# Patient Record
Sex: FEMALE | Race: WHITE | NOT HISPANIC OR LATINO | URBAN - NONMETROPOLITAN AREA
[De-identification: names, ages, dates, MRNs, and addresses within clinical notes are randomized per-mention and may not be internally consistent; named-entity substitution may affect disease eponyms.]

---

## 2023-01-26 ENCOUNTER — OFFICE (OUTPATIENT)
Dept: URBAN - NONMETROPOLITAN AREA CLINIC 5 | Facility: CLINIC | Age: 48
End: 2023-01-26
Payer: COMMERCIAL

## 2023-01-26 VITALS
RESPIRATION RATE: 18 BRPM | HEIGHT: 62 IN | DIASTOLIC BLOOD PRESSURE: 98 MMHG | HEART RATE: 105 BPM | WEIGHT: 229 LBS | SYSTOLIC BLOOD PRESSURE: 137 MMHG

## 2023-01-26 DIAGNOSIS — R10.84 GENERALIZED ABDOMINAL PAIN: ICD-10-CM

## 2023-01-26 DIAGNOSIS — R19.8 OTHER SPECIFIED SYMPTOMS AND SIGNS INVOLVING THE DIGESTIVE S: ICD-10-CM

## 2023-01-26 DIAGNOSIS — K76.0 FATTY (CHANGE OF) LIVER, NOT ELSEWHERE CLASSIFIED: ICD-10-CM

## 2023-01-26 DIAGNOSIS — Z86.010 PERSONAL HISTORY OF COLONIC POLYPS: ICD-10-CM

## 2023-01-26 DIAGNOSIS — R10.13 EPIGASTRIC PAIN: ICD-10-CM

## 2023-01-26 PROCEDURE — 99204 OFFICE O/P NEW MOD 45 MIN: CPT | Performed by: INTERNAL MEDICINE

## 2023-01-26 NOTE — SERVICEHPINOTES
Matilde Kelly   is a   46 yo  female  with a past medical history of anxiety, depression, bipolar disorder, GERD, HTN, HLD, fatty liver disease, and IBS who presents to establish care.  Patient reports that she has a history of fatty liver.  She previously followed with a gastroenterologist in [place].  She moved from there "at least 3 years ago".  She would like to reestablish with a GI physician.  She was told then she had fatty liver disease.  She did undergo biopsy.  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  She is on a fluid pill for unclear reasons.  She does report having an EGD and was told she had a hiatal hernia.  She does endorse a dull/achy pain across the top of her abdomen which comes and goes.  She has not noted association with timing of the day or certain foods.  It can last up to half a day at a time.  She has not seen any alleviating or exacerbating factors.  She also has epigastric discomfort which she attributes to her history of hiatal hernia.  She does have frequent acid reflux and dysphagia.  She denies odynophagia, weight loss, bright red blood per rectum, melena, nausea, vomiting, or hematemesis.  She is taking omeprazole 40 mg twice daily 30-45 minutes prior to breakfast and dinner.  She  also reports her bowels can be irregular and loose at times.  She never has constipation.  She has never attempted fiber for regularity bowels.  She has never been on ib Heriberto for abdominal cramping.  She has not had any recent blood work for her fatty liver disease or an ultrasound.

## 2023-01-26 NOTE — SERVICENOTES
Given patient's epigastric pain, history of hiatal hernia, and questionable liver disease will plan for EGD for further assessment.  Given history of adenomatous polyp in the past and irregular bowel habits will proceed with repeat colonoscopy as well.  For patient's suspected IBS I counseled her on soluble fiber use with Metamucil as above and attempting ib Heriberto.  For her history of fatty liver disease will plan for blood work as above.  Will track down prior records for review.  She reports she is "on a fluid pill" and believes the prior physician told her that she had some type of inherited abnormality in her liver.  Will plan to review the records.

## 2023-03-14 ENCOUNTER — ON CAMPUS - OUTPATIENT (OUTPATIENT)
Dept: URBAN - NONMETROPOLITAN AREA HOSPITAL 28 | Facility: HOSPITAL | Age: 48
End: 2023-03-14
Payer: COMMERCIAL

## 2023-03-14 DIAGNOSIS — K62.1 RECTAL POLYP: ICD-10-CM

## 2023-03-14 DIAGNOSIS — D12.3 BENIGN NEOPLASM OF TRANSVERSE COLON: ICD-10-CM

## 2023-03-14 DIAGNOSIS — K29.70 GASTRITIS, UNSPECIFIED, WITHOUT BLEEDING: ICD-10-CM

## 2023-03-14 DIAGNOSIS — R13.10 DYSPHAGIA, UNSPECIFIED: ICD-10-CM

## 2023-03-14 DIAGNOSIS — K57.30 DIVERTICULOSIS OF LARGE INTESTINE WITHOUT PERFORATION OR ABS: ICD-10-CM

## 2023-03-14 DIAGNOSIS — R19.4 CHANGE IN BOWEL HABIT: ICD-10-CM

## 2023-03-14 DIAGNOSIS — K22.2 ESOPHAGEAL OBSTRUCTION: ICD-10-CM

## 2023-03-14 DIAGNOSIS — K44.9 DIAPHRAGMATIC HERNIA WITHOUT OBSTRUCTION OR GANGRENE: ICD-10-CM

## 2023-03-14 PROCEDURE — 43239 EGD BIOPSY SINGLE/MULTIPLE: CPT | Performed by: INTERNAL MEDICINE

## 2023-03-14 PROCEDURE — 45385 COLONOSCOPY W/LESION REMOVAL: CPT | Performed by: INTERNAL MEDICINE

## 2023-03-14 PROCEDURE — 45380 COLONOSCOPY AND BIOPSY: CPT | Mod: 59 | Performed by: INTERNAL MEDICINE

## 2023-03-14 PROCEDURE — 43450 DILATE ESOPHAGUS 1/MULT PASS: CPT | Performed by: INTERNAL MEDICINE
